# Patient Record
(demographics unavailable — no encounter records)

---

## 2025-01-22 NOTE — PHYSICAL EXAM
The patient is a 75y Male complaining of [No Acute Distress] : no acute distress [PERRL] : pupils equal round and reactive to light [Normal TMs] : both tympanic membranes were normal [Supple] : supple [Clear to Auscultation] : lungs were clear to auscultation bilaterally [Regular Rhythm] : with a regular rhythm [No Murmur] : no murmur heard [No Edema] : there was no peripheral edema [Normal] : soft, non-tender, non-distended, no masses palpated, no HSM and normal bowel sounds [Normal Supraclavicular Nodes] : no supraclavicular lymphadenopathy [Normal Posterior Cervical Nodes] : no posterior cervical lymphadenopathy [Normal Anterior Cervical Nodes] : no anterior cervical lymphadenopathy [No Joint Swelling] : no joint swelling [Normal Gait] : normal gait [Normal Insight/Judgement] : insight and judgment were intact

## 2025-01-26 NOTE — HISTORY OF PRESENT ILLNESS
[FreeTextEntry1] : p t here for cpe and management of asthma hld  hyper parathyroid  kidney stones  [de-identified] : still having hematuria seeing gu asthma stable but uses rescue once a day  ra in remission with hydroxychloroquine

## 2025-01-26 NOTE — HISTORY OF PRESENT ILLNESS
[FreeTextEntry1] : p t here for cpe and management of asthma hld  hyper parathyroid  kidney stones  [de-identified] : still having hematuria seeing gu asthma stable but uses rescue once a day  ra in remission with hydroxychloroquine

## 2025-01-26 NOTE — HEALTH RISK ASSESSMENT
[Good] : ~his/her~  mood as  good [No] : In the past 12 months have you used drugs other than those required for medical reasons? No [No falls in past year] : Patient reported no falls in the past year [0] : 2) Feeling down, depressed, or hopeless: Not at all (0) [PHQ-2 Negative - No further assessment needed] : PHQ-2 Negative - No further assessment needed [Former] : Former [15-19] : 15-19 [< 15 Years] : < 15 Years [Patient reported mammogram was normal] : Patient reported mammogram was normal [Change in mental status noted] : Change in mental status noted [None] : None [With Family] : lives with family [Retired] : retired [High School] : high school [] :  [# Of Children ___] : has [unfilled] children [Fully functional (bathing, dressing, toileting, transferring, walking, feeding)] : Fully functional (bathing, dressing, toileting, transferring, walking, feeding) [Fully functional (using the telephone, shopping, preparing meals, housekeeping, doing laundry, using] : Fully functional and needs no help or supervision to perform IADLs (using the telephone, shopping, preparing meals, housekeeping, doing laundry, using transportation, managing medications and managing finances) [Smoke Detector] : smoke detector [Carbon Monoxide Detector] : carbon monoxide detector [Seat Belt] :  uses seat belt [Patient/Caregiver unclear of wishes] : , patient/caregiver unclear of wishes [Name: ___] : Health Care Proxy's Name: [unfilled]  [Relationship: ___] : Relationship: [unfilled] [Time Spent: ___ minutes] : Time Spent: [unfilled] minutes [de-identified] : urology  [de-identified] : no  [[id number]] : 0 [Language] : denies difficulty with language [Behavior] : denies difficulty with behavior [Learning/Retaining New Information] : denies difficulty learning/retaining new information [Handling Complex Tasks] : denies difficulty handling complex tasks [Reasoning] : denies difficulty with reasoning [Spatial Ability and Orientation] : denies difficulty with spatial ability and orientation [Reports changes in hearing] : Reports no changes in hearing [Reports changes in vision] : Reports no changes in vision [Reports changes in dental health] : Reports no changes in dental health [MammogramDate] : 11/3/24 [de-identified] :   [AdvancecareDate] : 1/25 [FreeTextEntry4] : 16 minutes  spent discussing  end of life care and options for treatment such as, a DNR, DNI, dialysis, tube feeds and if patient becomes unable to make decisions for self and has incurable disease that treatment outweighs benefits.given info

## 2025-01-26 NOTE — HEALTH RISK ASSESSMENT
[Good] : ~his/her~  mood as  good [No] : In the past 12 months have you used drugs other than those required for medical reasons? No [No falls in past year] : Patient reported no falls in the past year [0] : 2) Feeling down, depressed, or hopeless: Not at all (0) [PHQ-2 Negative - No further assessment needed] : PHQ-2 Negative - No further assessment needed [Former] : Former [15-19] : 15-19 [< 15 Years] : < 15 Years [Patient reported mammogram was normal] : Patient reported mammogram was normal [Change in mental status noted] : Change in mental status noted [None] : None [With Family] : lives with family [Retired] : retired [High School] : high school [] :  [# Of Children ___] : has [unfilled] children [Fully functional (bathing, dressing, toileting, transferring, walking, feeding)] : Fully functional (bathing, dressing, toileting, transferring, walking, feeding) [Fully functional (using the telephone, shopping, preparing meals, housekeeping, doing laundry, using] : Fully functional and needs no help or supervision to perform IADLs (using the telephone, shopping, preparing meals, housekeeping, doing laundry, using transportation, managing medications and managing finances) [Smoke Detector] : smoke detector [Carbon Monoxide Detector] : carbon monoxide detector [Seat Belt] :  uses seat belt [Patient/Caregiver unclear of wishes] : , patient/caregiver unclear of wishes [Name: ___] : Health Care Proxy's Name: [unfilled]  [Relationship: ___] : Relationship: [unfilled] [Time Spent: ___ minutes] : Time Spent: [unfilled] minutes [de-identified] : urology  [de-identified] : no  [[id number]] : 0 [Language] : denies difficulty with language [Behavior] : denies difficulty with behavior [Learning/Retaining New Information] : denies difficulty learning/retaining new information [Handling Complex Tasks] : denies difficulty handling complex tasks [Reasoning] : denies difficulty with reasoning [Spatial Ability and Orientation] : denies difficulty with spatial ability and orientation [Reports changes in hearing] : Reports no changes in hearing [Reports changes in vision] : Reports no changes in vision [Reports changes in dental health] : Reports no changes in dental health [MammogramDate] : 11/3/24 [de-identified] :   [AdvancecareDate] : 1/25 [FreeTextEntry4] : 16 minutes  spent discussing  end of life care and options for treatment such as, a DNR, DNI, dialysis, tube feeds and if patient becomes unable to make decisions for self and has incurable disease that treatment outweighs benefits.given info

## 2025-01-26 NOTE — ASSESSMENT
[FreeTextEntry1] : asthma continue breztri hld continue simvastatin s/p  parathyroidectomy repeat labs

## 2025-04-14 NOTE — ASSESSMENT
[FreeTextEntry1] : ASTHMA CONTINUE BREZTRI  HLD CONTINUE SIMVASTATIN HYPERPARATHYOID lab evaluation PTH

## 2025-04-14 NOTE — HISTORY OF PRESENT ILLNESS
[FreeTextEntry1] : pt here for management of ASTHMA HYPERPARATHYROID  RA  [de-identified] : DOING WELL RECENTLY SAW  ANNIKA HOBSON OPHTHALMOLOGIST NOT USING RESCUE